# Patient Record
Sex: MALE | Race: WHITE | Employment: FULL TIME | URBAN - NONMETROPOLITAN AREA
[De-identification: names, ages, dates, MRNs, and addresses within clinical notes are randomized per-mention and may not be internally consistent; named-entity substitution may affect disease eponyms.]

---

## 2022-01-19 ENCOUNTER — OFFICE VISIT (OUTPATIENT)
Dept: PRIMARY CARE CLINIC | Age: 67
End: 2022-01-19

## 2022-01-19 VITALS
BODY MASS INDEX: 41.75 KG/M2 | SYSTOLIC BLOOD PRESSURE: 132 MMHG | HEIGHT: 73 IN | OXYGEN SATURATION: 98 % | RESPIRATION RATE: 18 BRPM | WEIGHT: 315 LBS | HEART RATE: 96 BPM | DIASTOLIC BLOOD PRESSURE: 82 MMHG

## 2022-01-19 DIAGNOSIS — L20.9 ATOPIC DERMATITIS, UNSPECIFIED TYPE: ICD-10-CM

## 2022-01-19 DIAGNOSIS — L02.91 ABSCESS: Primary | ICD-10-CM

## 2022-01-19 RX ORDER — ATENOLOL 100 MG/1
TABLET ORAL
COMMUNITY
Start: 2021-10-28

## 2022-01-19 RX ORDER — SULFAMETHOXAZOLE AND TRIMETHOPRIM 800; 160 MG/1; MG/1
1 TABLET ORAL 2 TIMES DAILY
Qty: 20 TABLET | Refills: 0 | Status: SHIPPED | OUTPATIENT
Start: 2022-01-19 | End: 2022-01-29

## 2022-01-19 RX ORDER — BETAMETHASONE DIPROPIONATE 0.05 %
OINTMENT (GRAM) TOPICAL
Qty: 30 G | Refills: 0 | Status: SHIPPED | OUTPATIENT
Start: 2022-01-19

## 2022-01-19 RX ORDER — LISINOPRIL AND HYDROCHLOROTHIAZIDE 25; 20 MG/1; MG/1
TABLET ORAL
COMMUNITY
Start: 2021-10-28

## 2022-01-19 ASSESSMENT — ENCOUNTER SYMPTOMS
VOMITING: 0
RESPIRATORY NEGATIVE: 1
COLOR CHANGE: 1

## 2022-01-19 NOTE — PROGRESS NOTES
[place]  [facility] 65 22 [address] [facility]  Dept: [phone]  Loc: [phone]    Concha Sawyer (:  1955) is a 73 y.o. male,New patient, here for evaluation of the following chief complaint(s):  Cyst (cyst on his chest that opened about 4 days ago, states it was dx in the past as an oil pocket )    Acute convenience Walk IN New pt    ASSESSMENT/PLAN:  1. Abscess  -     sulfamethoxazole-trimethoprim (BACTRIM DS) 800-160 MG per tablet; Take 1 tablet by mouth 2 times daily for 10 days, Disp-20 tablet, R-0Normal  2. Atopic dermatitis, unspecified type    Abscess:  AVS instructions provided and wound care discussed with symptoms to monitor for and requested wound check Monday as he will be leaving the area and not be coming back from [facility] until next week. He is to call for worsening symptoms    Wound was cleansed ans rinsed with chlorhexidine and sterile saline  Provided with dressing material  Covered with non adhering dressing, fluffs and paper tape after cleaning    Atopic Dermatitis:    The legs appear to have crusting scaling type lesions along the shins and are reports as itching  He has been using hydrocortisone OTC  History of dermatitis of the lower extremities  Denies DM  Advised to try some Diprolene to see if this will help the inflammation to the lower extremities     Return in about 5 days (around 2022). SUBJECTIVE/OBJECTIVE:  Judi Ngo comes in today for an acute visit for a skin issue. Draining lesion to anterior chest for 2 weeks. He reports that he has a 2 year history of nodule that a PCP saw him for and told him it was a oil gland. He reports that 2 weeks ago he squeezed the lesion it opened and drained yellow brown discharge and has been swolle and draining since that time. He is concerned for infection. Rash  This is a chronic problem.  The current episode started 1 to 4 weeks ago. The affected locations include the chest. The rash is characterized by redness, swelling and draining. Pertinent negatives include no fever or vomiting. Past treatments include nothing. Review of Systems   Constitutional: Negative for diaphoresis and fever. Respiratory: Negative. Cardiovascular: Negative. Gastrointestinal: Negative for vomiting. Skin: Positive for color change and wound. Negative for rash. Hematological: Negative for adenopathy. Physical Exam  Constitutional:       Appearance: Normal appearance. He is obese. Cardiovascular:      Pulses: Normal pulses. Pulmonary:      Effort: Pulmonary effort is normal.   Skin:     General: Skin is warm and dry. Capillary Refill: Capillary refill takes 2 to 3 seconds. Findings: Abscess, erythema, lesion and rash present. Rash is pustular. Comments: 1 inch nodular lesion to the anterior left chest wall  Positive for induration, erythema, hard nodule like presentation  The lesions is draining brown and yellow discharge    Legs:  Intense itching  erythematous scaling  crusting  scabbed rash to the shins and skin surface of BLE     Neurological:      Mental Status: He is alert and oriented to person, place, and time. Additional Information:    /82   Pulse 96   Resp 18   Ht 6' 1\" (1.854 m)   Wt (!) 331 lb (150.1 kg)   SpO2 98%   BMI 43.67 kg/m²     An electronic signature was used to authenticate this note.     --CAROLYNE Hernández - CNP

## 2022-01-19 NOTE — PATIENT INSTRUCTIONS
Patient Education        Skin Abscess: Care Instructions  Your Care Instructions     A skin abscess is a bacterial infection that forms a pocket of pus. A boil is a kind of skin abscess. The doctor may have cut an opening in the abscess so that the pus can drain out. You may have gauze in the cut so that the abscess will stay open and keep draining. You may need antibiotics. You will need to follow up with your doctor to make sure the infection has gone away. The doctor has checked you carefully, but problems can develop later. If you notice any problems or new symptoms, get medical treatment right away. Follow-up care is a key part of your treatment and safety. Be sure to make and go to all appointments, and call your doctor if you are having problems. It's also a good idea to know your test results and keep a list of the medicines you take. How can you care for yourself at home? · Apply warm and dry compresses, a heating pad set on low, or a hot water bottle 3 or 4 times a day for pain. Keep a cloth between the heat source and your skin. · If your doctor prescribed antibiotics, take them as directed. Do not stop taking them just because you feel better. You need to take the full course of antibiotics. · Take pain medicines exactly as directed. ? If the doctor gave you a prescription medicine for pain, take it as prescribed. ? If you are not taking a prescription pain medicine, ask your doctor if you can take an over-the-counter medicine. · Keep your bandage clean and dry. Change the bandage whenever it gets wet or dirty, or at least one time a day. · If the abscess was packed with gauze:  ? Keep follow-up appointments to have the gauze changed or removed. If the doctor instructed you to remove the gauze, follow the instructions you were given for how to remove it. ? After the gauze is removed, soak the area in warm water for 15 to 20 minutes 2 times a day, until the wound closes.   When should you call for help? Call your doctor now or seek immediate medical care if:    · You have signs of worsening infection, such as:  ? Increased pain, swelling, warmth, or redness. ? Red streaks leading from the infected skin. ? Pus draining from the wound. ? A fever. Watch closely for changes in your health, and be sure to contact your doctor if:    · You do not get better as expected. Where can you learn more? Go to https://[employer]ypepiceweb.[employer]. org and sign in to your [employer] account. Enter J668 in the [employer]. box to learn more about \"Skin Abscess: Care Instructions. \"     If you do not have an account, please click on the \"Sign Up Now\" link. Current as of: March 3, 2021               Content Version: 13.1  © 3485-4533 Healthwise, Incorporated. Care instructions adapted under license by [facility] ([facility]). If you have questions about a medical condition or this instruction, always ask your healthcare professional. [place] [address] any warranty or liability for your use of this information.

## 2022-01-26 ENCOUNTER — OFFICE VISIT (OUTPATIENT)
Dept: PRIMARY CARE CLINIC | Age: 67
End: 2022-01-26

## 2022-01-26 VITALS
WEIGHT: 315 LBS | BODY MASS INDEX: 41.75 KG/M2 | RESPIRATION RATE: 18 BRPM | DIASTOLIC BLOOD PRESSURE: 78 MMHG | HEIGHT: 73 IN | OXYGEN SATURATION: 98 % | HEART RATE: 90 BPM | SYSTOLIC BLOOD PRESSURE: 130 MMHG

## 2022-01-26 DIAGNOSIS — L72.0 EPIDERMOID CYST OF SKIN OF CHEST: ICD-10-CM

## 2022-01-26 DIAGNOSIS — L02.91 ABSCESS: Primary | ICD-10-CM

## 2022-01-26 RX ORDER — AMOXICILLIN AND CLAVULANATE POTASSIUM 875; 125 MG/1; MG/1
1 TABLET, FILM COATED ORAL 2 TIMES DAILY
Qty: 20 TABLET | Refills: 0 | Status: SHIPPED | OUTPATIENT
Start: 2022-01-26 | End: 2022-02-05

## 2022-01-26 ASSESSMENT — ENCOUNTER SYMPTOMS
VOMITING: 0
RESPIRATORY NEGATIVE: 1
COLOR CHANGE: 1

## 2022-01-26 NOTE — PROGRESS NOTES
[place]  [facility] [address] 22 [address] [facility]  Dept: [phone]  Loc: [phone]    Felisha Cash (:  1955) is a 73 y.o. [facility] patient, here for evaluation of the following chief complaint(s): Wound Check (here for a wound check to area on chest, states he is having improvement. )    Acute convenience Walk IN New pt    ASSESSMENT/PLAN:  1. Abscess  -     amoxicillin-clavulanate (AUGMENTIN) 875-125 MG per tablet; Take 1 tablet by mouth 2 times daily for 10 days, Disp-20 tablet, R-0Normal  -     External Referral To Dermatology  2. Epidermoid cyst of skin of chest  -     amoxicillin-clavulanate (AUGMENTIN) 875-125 MG per tablet; Take 1 tablet by mouth 2 times daily for 10 days, Disp-20 tablet, R-0Normal  -     External Referral To Dermatology    Chest Cystic Abscess:  AVS instructions provided and wound care discussed with symptoms to monitor for and requested wound check again in one week as he will be leaving to travel home to [facility] until next week. He works onsite but travels home weekly. He is to call for worsening symptoms, fever, chills or seek care    Wound was cleansed ans rinsed with chlorhexidine and sterile saline  Provided with non adhering dressing material and given supplies for wound care as the area is still draining. Induration area palpated and noted. He also has been using heat compress PRN to facilitate drainage. Will cover with additional atb coverage with Augmentin and finish the Bactrim that he was prescribed last week. Referral to Dermatologist per his request for eval and tx  as the lesion as this has been present for > 2 years and  He reports periodically becomes inflamed and infected.  His PCP has seen him for this condition in the past.       Atopic Dermatitis:    The legs appear to have crusting scaling type lesions along the shins and are reports as itching, he was prescribed diprolene and is showing improvements   Reports less itching and scabbing is resolving, skin is still dry and flaking, no signs of secondary skin infection. History of dermatitis of the lower extremities  Denies DM  Advised to continue use of the Diprolene. No follow-ups on file. SUBJECTIVE/OBJECTIVE:  Rohith Mendoza comes in today for follow up from an  an acute visit for a skin issue. He had a draining lesion to anterior chest for 2 weeks. ,   He reports that he has a 2 year history of nodule that a PCP saw him for and told him it was a oil gland. He reports that 3 weeks ago he squeezed the lesion it opened and drained yellow brown discharge and has been swollen and draining since that time. He was seen and started on Bactrim for concern for abscess and asked to follow up for wound check  He has been using chlorhexidine wash and covering daily  Reports pain less intense, draining white cloudy material.             Rash  This is a chronic problem. The current episode started 1 to 4 weeks ago. The affected locations include the chest. The rash is characterized by redness, swelling and draining. Pertinent negatives include no fever or vomiting. Past treatments include nothing. Wound Check  He was originally treated 3 to 5 days ago. Previous treatment included oral antibiotics and wound cleansing or irrigation. There has been colored discharge from the wound. The redness has improved. The swelling has improved. The pain has improved. Review of Systems   Constitutional: Negative for diaphoresis and fever. Respiratory: Negative. Cardiovascular: Negative. Gastrointestinal: Negative for vomiting. Skin: Positive for color change and wound. Negative for rash. Reports itching at indurated area of the chest lateral left sternal border. Hematological: Negative for adenopathy. Physical Exam  Constitutional:       Appearance: Normal appearance. He is obese. Cardiovascular:      Pulses: Normal pulses. Pulmonary:      Effort: Pulmonary effort is normal.   Skin:     General: Skin is warm and dry. Capillary Refill: Capillary refill takes 2 to 3 seconds. Findings: Abscess, erythema, lesion and rash present. Rash is pustular. Comments:  nodular lesion to the anterior left chest wall  Positive for induration extending approximately 2 inches oval hard indurated area. + erythema improved from prior visit, surrounding tissue of draining area has a hard nodular presentation  The lesions is draining a grayish/white discharge today  The brown and yellow discharge is resolving, no further bleeding    Legs:  Intense itching  erythematous scaling  crusting  scabbed rash to the shins and skin surface of BLE     Neurological:      Mental Status: He is alert and oriented to person, place, and time. Additional Information:    /78   Pulse 90   Resp 18   Ht 6' 1\" (1.854 m)   Wt (!) 331 lb (150.1 kg)   SpO2 98%   BMI 43.67 kg/m²     An electronic signature was used to authenticate this note.     --CAROLYNE Brooks - CNP

## 2022-01-26 NOTE — PATIENT INSTRUCTIONS
Continue to keep clean covered while draining  Started on second antibiotic as drainage still appears purulent, finish the bactrim  Continue skin care as described prior visit. I have made a dermatology referral for you. We will Call [facility] in [facility] to fax the information when they return to office.    Follow up one week for wound check

## 2023-05-15 ENCOUNTER — TELEPHONE (OUTPATIENT)
Dept: PRIMARY CARE | Facility: CLINIC | Age: 68
End: 2023-05-15
Payer: COMMERCIAL

## 2023-05-15 DIAGNOSIS — I10 HYPERTENSION, UNSPECIFIED TYPE: ICD-10-CM

## 2023-05-15 RX ORDER — LISINOPRIL AND HYDROCHLOROTHIAZIDE 20; 25 MG/1; MG/1
1 TABLET ORAL DAILY
COMMUNITY
Start: 2020-01-10 | End: 2023-05-15 | Stop reason: SDUPTHER

## 2023-05-15 RX ORDER — LISINOPRIL AND HYDROCHLOROTHIAZIDE 20; 25 MG/1; MG/1
1 TABLET ORAL DAILY
Qty: 5 TABLET | Refills: 0 | Status: SHIPPED | OUTPATIENT
Start: 2023-05-15 | End: 2023-07-28 | Stop reason: SDUPTHER

## 2023-05-15 RX ORDER — ASPIRIN 81 MG/1
81 TABLET ORAL DAILY
COMMUNITY

## 2023-05-15 RX ORDER — ATENOLOL 100 MG/1
1 TABLET ORAL DAILY
COMMUNITY
Start: 2020-01-10 | End: 2023-07-28 | Stop reason: SDUPTHER

## 2023-05-15 RX ORDER — CYCLOBENZAPRINE HCL 10 MG
10 TABLET ORAL 3 TIMES DAILY
COMMUNITY
Start: 2023-01-20

## 2023-07-19 DIAGNOSIS — E11.69 TYPE 2 DIABETES MELLITUS WITH OTHER SPECIFIED COMPLICATION, WITHOUT LONG-TERM CURRENT USE OF INSULIN (MULTI): ICD-10-CM

## 2023-07-19 DIAGNOSIS — Z12.5 SCREENING FOR PROSTATE CANCER: ICD-10-CM

## 2023-07-19 DIAGNOSIS — I10 HYPERTENSION, UNSPECIFIED TYPE: ICD-10-CM

## 2023-07-28 DIAGNOSIS — I10 HYPERTENSION, UNSPECIFIED TYPE: ICD-10-CM

## 2023-07-28 RX ORDER — LISINOPRIL AND HYDROCHLOROTHIAZIDE 20; 25 MG/1; MG/1
1 TABLET ORAL DAILY
Qty: 90 TABLET | Refills: 0 | Status: SHIPPED | OUTPATIENT
Start: 2023-07-28 | End: 2023-08-02

## 2023-07-28 RX ORDER — ATENOLOL 100 MG/1
100 TABLET ORAL DAILY
Qty: 90 TABLET | Refills: 0 | Status: SHIPPED | OUTPATIENT
Start: 2023-07-28

## 2023-07-28 NOTE — TELEPHONE ENCOUNTER
PATIENT WAS LAID OFF FROM [employer]. HE IS PLANNING TO APPLY FOR MEDICAID. WITH NO INSURANCE HE WILL NOT BE ABLE TO MAKE AN APPOINTMENT UNTIL HE HAS COVERAGE.    REQUESTED REFILLS FOR HIS BP MED. PROPOSE.

## 2023-08-10 PROBLEM — M25.519 SHOULDER PAIN: Status: ACTIVE | Noted: 2023-08-10

## 2023-08-10 PROBLEM — E66.01 OBESITY, MORBID, BMI 40.0-49.9 (MULTI): Status: ACTIVE | Noted: 2023-08-10

## 2023-08-10 PROBLEM — E11.9 DIABETES (MULTI): Status: ACTIVE | Noted: 2023-08-10

## 2023-08-10 PROBLEM — M54.2 NECK PAIN: Status: ACTIVE | Noted: 2023-08-10

## 2023-08-10 PROBLEM — J30.1 HAY FEVER: Status: ACTIVE | Noted: 2023-08-10

## 2023-08-10 PROBLEM — M75.41 IMPINGEMENT SYNDROME OF RIGHT SHOULDER: Status: ACTIVE | Noted: 2023-08-10

## 2023-08-10 PROBLEM — R19.5 POSITIVE COLORECTAL CANCER SCREENING USING COLOGUARD TEST: Status: ACTIVE | Noted: 2023-08-10

## 2023-08-10 PROBLEM — I10 HYPERTENSION, ESSENTIAL, BENIGN: Status: ACTIVE | Noted: 2023-08-10

## 2023-08-10 PROBLEM — I48.91 ATRIAL FIBRILLATION (MULTI): Status: ACTIVE | Noted: 2023-08-10

## 2023-08-10 PROBLEM — M54.16 LUMBAR RADICULOPATHY: Status: ACTIVE | Noted: 2023-08-10

## 2023-08-10 PROBLEM — M54.12 CERVICAL RADICULOPATHY: Status: ACTIVE | Noted: 2023-08-10

## 2023-08-11 ENCOUNTER — APPOINTMENT (OUTPATIENT)
Dept: PRIMARY CARE | Facility: CLINIC | Age: 68
End: 2023-08-11
Payer: COMMERCIAL

## 2023-10-02 ENCOUNTER — TELEPHONE (OUTPATIENT)
Dept: PHARMACY | Facility: HOSPITAL | Age: 68
End: 2023-10-02

## 2023-10-02 ENCOUNTER — TELEPHONE (OUTPATIENT)
Dept: CARDIOLOGY | Facility: CLINIC | Age: 68
End: 2023-10-02

## 2023-10-02 DIAGNOSIS — I48.91 ATRIAL FIBRILLATION, UNSPECIFIED TYPE (MULTI): ICD-10-CM

## 2023-10-02 NOTE — TELEPHONE ENCOUNTER
I received a Clinical Pharmacy Referral from Dr. Irwin Junior (usually follows with Dr. Hernandez) regarding Eliquis assistance. Please see information below:     Unfortunately, at this time, Deangelo Aldana is ineligible to receive financial assistance through  Patient Assistance Program because he is currently uninsured.    Patient was notified of ineligibility and given the following options: Patient states that his Medicare insurance will become active in Feb 2024. He does not have prescription insurance until then. I explained to the patient that we are not able to help him through our financial aid program until he gets prescription insurance. Options are (1) convert to warfarin or (2) ask for samples from cardio (pt is aware allocation of samples vary). Referral will be cancelled at this time. Patient will contact his cardiologist after Medicare is active (2/2024) if he needs additional assistance from pharmacy.     Referring provider will be notified of denial.     Amy Khan, PharmD

## 2023-10-04 ENCOUNTER — OFFICE VISIT (OUTPATIENT)
Dept: CARDIOLOGY | Facility: CLINIC | Age: 68
End: 2023-10-04

## 2023-10-04 VITALS
WEIGHT: 315 LBS | DIASTOLIC BLOOD PRESSURE: 72 MMHG | HEART RATE: 89 BPM | OXYGEN SATURATION: 96 % | BODY MASS INDEX: 41.75 KG/M2 | HEIGHT: 73 IN | SYSTOLIC BLOOD PRESSURE: 118 MMHG

## 2023-10-04 DIAGNOSIS — R79.89 ELEVATED SERUM CREATININE: ICD-10-CM

## 2023-10-04 DIAGNOSIS — I48.11 LONGSTANDING PERSISTENT ATRIAL FIBRILLATION (MULTI): Primary | ICD-10-CM

## 2023-10-04 PROCEDURE — 3078F DIAST BP <80 MM HG: CPT | Performed by: STUDENT IN AN ORGANIZED HEALTH CARE EDUCATION/TRAINING PROGRAM

## 2023-10-04 PROCEDURE — 99214 OFFICE O/P EST MOD 30 MIN: CPT | Performed by: STUDENT IN AN ORGANIZED HEALTH CARE EDUCATION/TRAINING PROGRAM

## 2023-10-04 PROCEDURE — 1159F MED LIST DOCD IN RCRD: CPT | Performed by: STUDENT IN AN ORGANIZED HEALTH CARE EDUCATION/TRAINING PROGRAM

## 2023-10-04 PROCEDURE — 3074F SYST BP LT 130 MM HG: CPT | Performed by: STUDENT IN AN ORGANIZED HEALTH CARE EDUCATION/TRAINING PROGRAM

## 2023-10-04 PROCEDURE — 1036F TOBACCO NON-USER: CPT | Performed by: STUDENT IN AN ORGANIZED HEALTH CARE EDUCATION/TRAINING PROGRAM

## 2023-10-04 NOTE — PROGRESS NOTES
Cardiology Subsequent Encounter Clinic Note  Name: Deangelo Aladna  MRN: [id number]  : 1955    CC: Atrial fibrillation    Active Issues:  67-year-old male with a medical history of atrial fibrillation, diabetes, hypertension, here to establish care regarding the following conditions:     Atrial fibrillation  Currently on atenolol 100 mg daily, Eliquis 5 mg twice daily     Atrial fibrillation was incidentally noted as part of her routine physical; patient currently denies any chest discomfort or shortness of breath.  Denies any orthopnea/PND/has minimal lower extremity edema.  Denies any dizziness or lightheadedness.  Echocardiogram performed 2022 shows preserved biventricular function.      Review of Systems  A complete 14 point review of systems was obtained and was negative other than what is stated in the history of present illness.    Past Medical History  No past medical history on file.    Past Surgical History  Past Surgical History:   Procedure Laterality Date    OTHER SURGICAL HISTORY  10/03/2022    Colonoscopy       Medications  Current Outpatient Medications on File Prior to Visit   Medication Sig Dispense Refill    apixaban (Eliquis) 5 mg tablet Take 1 tablet (5 mg) by mouth 2 times a day.      atenolol (Tenormin) 100 mg tablet Take 1 tablet (100 mg) by mouth once daily. 90 tablet 0    aspirin 81 mg EC tablet Take 1 tablet (81 mg) by mouth once daily.      cyclobenzaprine (Flexeril) 10 mg tablet Take 1 tablet (10 mg) by mouth 3 times a day.      lisinopriL-hydrochlorothiazide 20-25 mg tablet Take 1 tablet by mouth once daily for 5 days. 90 tablet 0     No current facility-administered medications on file prior to visit.       Allergies  No Known Allergies    Social History  Social History     Tobacco Use    Smoking status: Former     Types: Cigarettes    Smokeless tobacco: Never   Substance Use Topics    Alcohol use: Never    Drug use: Never       Family History  Family History   Problem  "Relation Name Age of Onset    Other (CVA) Mother      Hypertension Mother      Other (CVA) Father      Hypertension Father      Asthma Father      Lung cancer Sister      Diabetes type II Sister      Diabetes type II Other         Physical Examination  Vitals: /72   Pulse 89   Ht 1.854 m (6' 1\")   Wt 150 kg (330 lb)   SpO2 96%   BMI 43.54 kg/m²   General: awake, alert and oriented. No acute distress.   Skin: Skin is warm, dry and intact without rashes or lesions. Appropriate color for ethnicity. Nail beds pink with no cyanosis or clubbing  HEENT: normocephalic, atraumatic; conjunctivae are clear without exudates or hemorrhage. Sclera is non-icteric. Eyelids are normal in appearance without swelling or lesions. Hearing intact. Nares are patent bilaterally. Moist mucous membranes.   Cardiovascular: Regular. No murmurs, gallops, or rubs are auscultated. S1 and S2 are heard and are of normal intensity. No JVD, no carotid bruits  Respiratory: Thorax symmetric. CTAB, breath sounds vesicular. No crackles, wheezes or ronchi.   Gastrointestinal: soft, non-distended, BS + x 4  Genitourinary: exam deferred  Musculoskeletal: moves all extremities  Extremities: pulses palpable bilaterally; no swelling or erythema; no edema  Neurological: alert & oriented x 3; no focal deficits  Psychiatric: appropriate mood and affect       Labs/Imaging/Procedures    Lab Results   Component Value Date     08/11/2022    K 4.2 08/11/2022    CREATININE 1.40 (H) 08/11/2022    BUN 24 (H) 08/11/2022    CALCIUM 9.3 08/11/2022    LDLF 79 08/11/2022     Echo  10/22  CONCLUSIONS:   1. Left ventricular systolic function is normal with a 60% estimated ejection fraction.   2. Poorly visualized anatomical structures due to suboptimal image quality.    ELECTROCARDIOGRAM RHYTHM STRIP  Ordered by an unspecified provider.    Impression    67-year-old male with a medical history of atrial fibrillation, diabetes, hypertension, here to establish " care regarding the following conditions:     Atrial fibrillation  Currently on atenolol 100 mg daily, Eliquis 5 mg twice daily  -Ostensibly, appears to be paroxysmal/nonvalvular. Continue anticoagulation given high [id number] score     Plan  -Patient does not have insurance; plans to apply for patient assistance to be able to get Eliquis.  Rate control seems to be an appropriate strategy at this point since he is minimally symptomatic  -We will get repeat labs given minimally elevated creatinine on previous ones.  We will also get a CBC  -RTC 1 year    Michael Hernandez MD  Advanced Heart Failure/Transplant Cardiology  Cardio-Oncology  [facility]

## 2023-10-30 NOTE — TELEPHONE ENCOUNTER
Pt called office on 10/27, took last pill of Eliquis on hand that morning. Pt just got approved through [employer] to receive medication now through patient assistance. Expected to receive first supply on today 11/23. [facility] [address] [place] notified for 3 day supply to supplement.

## 2023-11-01 DIAGNOSIS — I48.91 ATRIAL FIBRILLATION, UNSPECIFIED TYPE (MULTI): ICD-10-CM

## 2024-07-29 ENCOUNTER — LAB (OUTPATIENT)
Dept: LAB | Facility: LAB | Age: 69
End: 2024-07-29
Payer: COMMERCIAL

## 2024-07-29 ENCOUNTER — APPOINTMENT (OUTPATIENT)
Dept: PRIMARY CARE | Facility: CLINIC | Age: 69
End: 2024-07-29
Payer: COMMERCIAL

## 2024-07-29 VITALS
SYSTOLIC BLOOD PRESSURE: 110 MMHG | HEIGHT: 73 IN | DIASTOLIC BLOOD PRESSURE: 70 MMHG | OXYGEN SATURATION: 96 % | WEIGHT: 315 LBS | HEART RATE: 92 BPM | BODY MASS INDEX: 41.75 KG/M2

## 2024-07-29 DIAGNOSIS — I48.91 ATRIAL FIBRILLATION, UNSPECIFIED TYPE (MULTI): ICD-10-CM

## 2024-07-29 DIAGNOSIS — I10 HYPERTENSION, ESSENTIAL, BENIGN: ICD-10-CM

## 2024-07-29 DIAGNOSIS — I10 HYPERTENSION, UNSPECIFIED TYPE: ICD-10-CM

## 2024-07-29 DIAGNOSIS — Z00.00 ROUTINE GENERAL MEDICAL EXAMINATION AT HEALTH CARE FACILITY: Primary | ICD-10-CM

## 2024-07-29 DIAGNOSIS — E11.9 TYPE 2 DIABETES MELLITUS WITHOUT COMPLICATION, WITHOUT LONG-TERM CURRENT USE OF INSULIN (MULTI): ICD-10-CM

## 2024-07-29 DIAGNOSIS — Z12.5 SCREENING FOR PROSTATE CANCER: ICD-10-CM

## 2024-07-29 DIAGNOSIS — E66.01 OBESITY, MORBID, BMI 40.0-49.9 (MULTI): ICD-10-CM

## 2024-07-29 PROBLEM — R19.5 POSITIVE COLORECTAL CANCER SCREENING USING COLOGUARD TEST: Status: RESOLVED | Noted: 2023-08-10 | Resolved: 2024-07-29

## 2024-07-29 LAB
ALBUMIN SERPL BCP-MCNC: 4.2 G/DL (ref 3.4–5)
ALP SERPL-CCNC: 65 U/L (ref 33–136)
ALT SERPL W P-5'-P-CCNC: 15 U/L (ref 10–52)
ANION GAP SERPL CALC-SCNC: 12 MMOL/L (ref 10–20)
AST SERPL W P-5'-P-CCNC: 16 U/L (ref 9–39)
BASOPHILS # BLD AUTO: 0.04 X10*3/UL (ref 0–0.1)
BASOPHILS NFR BLD AUTO: 0.5 %
BILIRUB SERPL-MCNC: 0.7 MG/DL (ref 0–1.2)
BUN SERPL-MCNC: 24 MG/DL (ref 6–23)
CALCIUM SERPL-MCNC: 9.7 MG/DL (ref 8.6–10.3)
CHLORIDE SERPL-SCNC: 102 MMOL/L (ref 98–107)
CHOLEST SERPL-MCNC: 163 MG/DL (ref 0–199)
CHOLESTEROL/HDL RATIO: 4.8
CO2 SERPL-SCNC: 30 MMOL/L (ref 21–32)
CREAT SERPL-MCNC: 1.66 MG/DL (ref 0.5–1.3)
EGFRCR SERPLBLD CKD-EPI 2021: 45 ML/MIN/1.73M*2
EOSINOPHIL # BLD AUTO: 0.21 X10*3/UL (ref 0–0.7)
EOSINOPHIL NFR BLD AUTO: 2.4 %
ERYTHROCYTE [DISTWIDTH] IN BLOOD BY AUTOMATED COUNT: 12.6 % (ref 11.5–14.5)
GLUCOSE SERPL-MCNC: 133 MG/DL (ref 74–99)
HCT VFR BLD AUTO: 47.3 % (ref 41–52)
HDLC SERPL-MCNC: 34 MG/DL
HGB BLD-MCNC: 15 G/DL (ref 13.5–17.5)
IMM GRANULOCYTES # BLD AUTO: 0.03 X10*3/UL (ref 0–0.7)
IMM GRANULOCYTES NFR BLD AUTO: 0.3 % (ref 0–0.9)
LDLC SERPL CALC-MCNC: 82 MG/DL
LYMPHOCYTES # BLD AUTO: 1.97 X10*3/UL (ref 1.2–4.8)
LYMPHOCYTES NFR BLD AUTO: 22.5 %
MCH RBC QN AUTO: 27.7 PG (ref 26–34)
MCHC RBC AUTO-ENTMCNC: 31.7 G/DL (ref 32–36)
MCV RBC AUTO: 87 FL (ref 80–100)
MONOCYTES # BLD AUTO: 0.77 X10*3/UL (ref 0.1–1)
MONOCYTES NFR BLD AUTO: 8.8 %
NEUTROPHILS # BLD AUTO: 5.74 X10*3/UL (ref 1.2–7.7)
NEUTROPHILS NFR BLD AUTO: 65.5 %
NON HDL CHOLESTEROL: 129 MG/DL (ref 0–149)
NRBC BLD-RTO: 0 /100 WBCS (ref 0–0)
PLATELET # BLD AUTO: 240 X10*3/UL (ref 150–450)
POTASSIUM SERPL-SCNC: 3.9 MMOL/L (ref 3.5–5.3)
PROT SERPL-MCNC: 6.9 G/DL (ref 6.4–8.2)
PSA SERPL-MCNC: 0.53 NG/ML
RBC # BLD AUTO: 5.42 X10*6/UL (ref 4.5–5.9)
SODIUM SERPL-SCNC: 140 MMOL/L (ref 136–145)
TRIGL SERPL-MCNC: 236 MG/DL (ref 0–149)
TSH SERPL-ACNC: 2.49 MIU/L (ref 0.44–3.98)
VLDL: 47 MG/DL (ref 0–40)
WBC # BLD AUTO: 8.8 X10*3/UL (ref 4.4–11.3)

## 2024-07-29 PROCEDURE — 1124F ACP DISCUSS-NO DSCNMKR DOCD: CPT | Performed by: FAMILY MEDICINE

## 2024-07-29 PROCEDURE — 1160F RVW MEDS BY RX/DR IN RCRD: CPT | Performed by: FAMILY MEDICINE

## 2024-07-29 PROCEDURE — 83036 HEMOGLOBIN GLYCOSYLATED A1C: CPT

## 2024-07-29 PROCEDURE — 80061 LIPID PANEL: CPT

## 2024-07-29 PROCEDURE — 1159F MED LIST DOCD IN RCRD: CPT | Performed by: FAMILY MEDICINE

## 2024-07-29 PROCEDURE — G0103 PSA SCREENING: HCPCS

## 2024-07-29 PROCEDURE — 3074F SYST BP LT 130 MM HG: CPT | Performed by: FAMILY MEDICINE

## 2024-07-29 PROCEDURE — 36415 COLL VENOUS BLD VENIPUNCTURE: CPT

## 2024-07-29 PROCEDURE — G0439 PPPS, SUBSEQ VISIT: HCPCS | Performed by: FAMILY MEDICINE

## 2024-07-29 PROCEDURE — 99213 OFFICE O/P EST LOW 20 MIN: CPT | Performed by: FAMILY MEDICINE

## 2024-07-29 PROCEDURE — 3078F DIAST BP <80 MM HG: CPT | Performed by: FAMILY MEDICINE

## 2024-07-29 PROCEDURE — 1036F TOBACCO NON-USER: CPT | Performed by: FAMILY MEDICINE

## 2024-07-29 PROCEDURE — 3008F BODY MASS INDEX DOCD: CPT | Performed by: FAMILY MEDICINE

## 2024-07-29 PROCEDURE — 80053 COMPREHEN METABOLIC PANEL: CPT

## 2024-07-29 PROCEDURE — 1170F FXNL STATUS ASSESSED: CPT | Performed by: FAMILY MEDICINE

## 2024-07-29 PROCEDURE — 84443 ASSAY THYROID STIM HORMONE: CPT

## 2024-07-29 PROCEDURE — 85025 COMPLETE CBC W/AUTO DIFF WBC: CPT

## 2024-07-29 RX ORDER — LISINOPRIL AND HYDROCHLOROTHIAZIDE 20; 25 MG/1; MG/1
1 TABLET ORAL DAILY
Qty: 90 TABLET | Refills: 3 | Status: SHIPPED | OUTPATIENT
Start: 2024-07-29 | End: 2025-07-29

## 2024-07-29 RX ORDER — ATENOLOL 100 MG/1
100 TABLET ORAL DAILY
Qty: 90 TABLET | Refills: 3 | Status: SHIPPED | OUTPATIENT
Start: 2024-07-29 | End: 2025-07-29

## 2024-07-29 ASSESSMENT — ENCOUNTER SYMPTOMS
NAUSEA: 0
ADENOPATHY: 0
TROUBLE SWALLOWING: 0
DEPRESSION: 0
NUMBNESS: 0
HEADACHES: 0
APPETITE CHANGE: 0
PALPITATIONS: 0
VOMITING: 0
COUGH: 0
ARTHRALGIAS: 0
LOSS OF SENSATION IN FEET: 0
SHORTNESS OF BREATH: 0
LIGHT-HEADEDNESS: 0
ABDOMINAL PAIN: 0
FATIGUE: 0
OCCASIONAL FEELINGS OF UNSTEADINESS: 0
DIZZINESS: 0
ACTIVITY CHANGE: 0
UNEXPECTED WEIGHT CHANGE: 0
NECK PAIN: 1
DIFFICULTY URINATING: 0
WHEEZING: 0
ABDOMINAL DISTENTION: 0
NECK STIFFNESS: 1
CONSTIPATION: 0
DIARRHEA: 0
RHINORRHEA: 0
WEAKNESS: 0

## 2024-07-29 ASSESSMENT — ACTIVITIES OF DAILY LIVING (ADL)
DOING_HOUSEWORK: INDEPENDENT
TAKING_MEDICATION: INDEPENDENT
GROCERY_SHOPPING: INDEPENDENT
BATHING: INDEPENDENT
MANAGING_FINANCES: INDEPENDENT
DRESSING: INDEPENDENT

## 2024-07-29 ASSESSMENT — PATIENT HEALTH QUESTIONNAIRE - PHQ9
2. FEELING DOWN, DEPRESSED OR HOPELESS: NOT AT ALL
1. LITTLE INTEREST OR PLEASURE IN DOING THINGS: NOT AT ALL
SUM OF ALL RESPONSES TO PHQ9 QUESTIONS 1 AND 2: 0

## 2024-07-29 NOTE — PROGRESS NOTES
"Subjective   Reason for Visit: Deangelo Aldana is an 68 y.o. male here for a Medicare Wellness visit.     Past Medical, Surgical, and Family History reviewed and updated in chart.    Reviewed all medications by prescribing practitioner or clinical pharmacist (such as prescriptions, OTCs, herbal therapies and supplements) and documented in the medical record.    HPI  No headache, chest pain, shortness of breath, dizziness, lightheadedness, or edema  Taking and tolerating Eliquis, seen Cardiology last fall, no palpitations  No low blood sugars since last OV, seen opthalmology in the past year, and no numbness or tingling in feet, skin normal.  Retired and on Medicare now  Occ GERD, no dysphagia  Neck pain at times, had MRI done and did PT, does HEP, no weakness, no leg weakness or falls  Defers pneumonia vaccine  \  Patient Care Team:  Navdeep Bear MD as PCP - General (Family Medicine)  Navdeep Bear MD as PCP - [facility] [employer] Advantage PCP     Review of Systems   Constitutional:  Negative for activity change, appetite change, fatigue and unexpected weight change.   HENT:  Negative for ear pain, nosebleeds, rhinorrhea, sneezing and trouble swallowing.    Respiratory:  Negative for cough, shortness of breath and wheezing.    Cardiovascular:  Negative for chest pain, palpitations and leg swelling.   Gastrointestinal:  Negative for abdominal distention, abdominal pain, constipation, diarrhea, nausea and vomiting.   Genitourinary:  Negative for difficulty urinating.   Musculoskeletal:  Positive for neck pain and neck stiffness. Negative for arthralgias.   Skin:  Negative for rash.   Neurological:  Negative for dizziness, weakness, light-headedness, numbness and headaches.   Hematological:  Negative for adenopathy.   Psychiatric/Behavioral:  Negative for behavioral problems.    All other systems reviewed and are negative.      Objective   Vitals:  /70   Pulse 92   Ht 1.854 m (6' 1\")   Wt " 147 kg (323 lb 9.6 oz)   SpO2 96%   BMI 42.69 kg/m²       Physical Exam  Vitals and nursing note reviewed.   Constitutional:       General: He is not in acute distress.     Appearance: Normal appearance. He is not toxic-appearing.   HENT:      Head: Normocephalic and atraumatic.      Right Ear: Tympanic membrane, ear canal and external ear normal.      Left Ear: Tympanic membrane, ear canal and external ear normal.      Nose: Nose normal.      Mouth/Throat:      Mouth: Mucous membranes are moist.      Pharynx: Oropharynx is clear.   Eyes:      Extraocular Movements: Extraocular movements intact.      Conjunctiva/sclera: Conjunctivae normal.      Pupils: Pupils are equal, round, and reactive to light.   Cardiovascular:      Rate and Rhythm: Normal rate and regular rhythm.      Pulses: Normal pulses.      Heart sounds: Normal heart sounds.   Pulmonary:      Effort: Pulmonary effort is normal.      Breath sounds: Normal breath sounds.   Abdominal:      General: Abdomen is flat. Bowel sounds are normal.      Palpations: Abdomen is soft.   Musculoskeletal:      Cervical back: Normal range of motion and neck supple.   Skin:     General: Skin is warm and dry.      Capillary Refill: Capillary refill takes less than 2 seconds.   Neurological:      General: No focal deficit present.      Mental Status: He is alert and oriented to person, place, and time. Mental status is at baseline.   Psychiatric:         Mood and Affect: Mood normal.         Behavior: Behavior normal.         Assessment/Plan   Problem List Items Addressed This Visit             ICD-10-CM    Atrial fibrillation (Multi) I48.91     Not aware of palpitations, tolerating apixaban, check labs.         Relevant Medications    atenolol (Tenormin) 100 mg tablet    Other Relevant Orders    Follow Up In Primary Care - Established    CBC and Auto Differential    Comprehensive Metabolic Panel    Diabetes (Multi) E11.9     Check laboratory testing, encouraged about  diet and exercise.         Relevant Orders    Follow Up In Primary Care - Established    Comprehensive Metabolic Panel    Hemoglobin A1C    TSH with reflex to Free T4 if abnormal    Lipid Panel    Hypertension, essential, benign I10     Blood pressure under good control, check electrolyte and renal functions.         Relevant Orders    Follow Up In Primary Care - Established    Comprehensive Metabolic Panel    Obesity, morbid, BMI 40.0-49.9 (Multi) E66.01     Encouraged about diet and exercise.         Relevant Orders    Follow Up In Primary Care - Established     Other Visit Diagnoses         Codes    Routine general medical examination at health care facility    -  Primary Z00.00    Relevant Orders    Follow Up In Primary Care - Established    Hypertension, unspecified type     I10    Relevant Medications    atenolol (Tenormin) 100 mg tablet    lisinopriL-hydrochlorothiazide 20-25 mg tablet    Other Relevant Orders    Follow Up In Primary Care - Established    Comprehensive Metabolic Panel    Screening for prostate cancer     Z12.5    Relevant Orders    Follow Up In Primary Care - Established    Prostate Specific Antigen, Screen

## 2024-07-30 LAB
EST. AVERAGE GLUCOSE BLD GHB EST-MCNC: 126 MG/DL
HBA1C MFR BLD: 6 %

## 2024-09-25 DIAGNOSIS — I48.91 ATRIAL FIBRILLATION, UNSPECIFIED TYPE (MULTI): ICD-10-CM

## 2024-10-03 ENCOUNTER — APPOINTMENT (OUTPATIENT)
Dept: CARDIOLOGY | Facility: CLINIC | Age: 69
End: 2024-10-03
Payer: COMMERCIAL

## 2024-10-03 VITALS
HEIGHT: 73 IN | HEART RATE: 79 BPM | BODY MASS INDEX: 41.75 KG/M2 | WEIGHT: 315 LBS | OXYGEN SATURATION: 96 % | DIASTOLIC BLOOD PRESSURE: 72 MMHG | SYSTOLIC BLOOD PRESSURE: 120 MMHG

## 2024-10-03 DIAGNOSIS — I48.91 ATRIAL FIBRILLATION, UNSPECIFIED TYPE (MULTI): ICD-10-CM

## 2024-10-03 PROCEDURE — 99213 OFFICE O/P EST LOW 20 MIN: CPT | Performed by: STUDENT IN AN ORGANIZED HEALTH CARE EDUCATION/TRAINING PROGRAM

## 2024-10-03 PROCEDURE — 3048F LDL-C <100 MG/DL: CPT | Performed by: STUDENT IN AN ORGANIZED HEALTH CARE EDUCATION/TRAINING PROGRAM

## 2024-10-03 PROCEDURE — 93000 ELECTROCARDIOGRAM COMPLETE: CPT | Performed by: STUDENT IN AN ORGANIZED HEALTH CARE EDUCATION/TRAINING PROGRAM

## 2024-10-03 PROCEDURE — 1159F MED LIST DOCD IN RCRD: CPT | Performed by: STUDENT IN AN ORGANIZED HEALTH CARE EDUCATION/TRAINING PROGRAM

## 2024-10-03 PROCEDURE — 3074F SYST BP LT 130 MM HG: CPT | Performed by: STUDENT IN AN ORGANIZED HEALTH CARE EDUCATION/TRAINING PROGRAM

## 2024-10-03 PROCEDURE — 3044F HG A1C LEVEL LT 7.0%: CPT | Performed by: STUDENT IN AN ORGANIZED HEALTH CARE EDUCATION/TRAINING PROGRAM

## 2024-10-03 PROCEDURE — 3008F BODY MASS INDEX DOCD: CPT | Performed by: STUDENT IN AN ORGANIZED HEALTH CARE EDUCATION/TRAINING PROGRAM

## 2024-10-03 PROCEDURE — 1036F TOBACCO NON-USER: CPT | Performed by: STUDENT IN AN ORGANIZED HEALTH CARE EDUCATION/TRAINING PROGRAM

## 2024-10-03 PROCEDURE — 3078F DIAST BP <80 MM HG: CPT | Performed by: STUDENT IN AN ORGANIZED HEALTH CARE EDUCATION/TRAINING PROGRAM

## 2024-10-03 RX ORDER — BISMUTH SUBSALICYLATE 262 MG
1 TABLET,CHEWABLE ORAL DAILY
COMMUNITY

## 2024-10-03 NOTE — PROGRESS NOTES
Cardiology Subsequent Encounter Clinic Note  Name: Deangelo Aldana  MRN: [id number]  : 1955    CC: Atrial fibrillation    Active Issues:  68-year-old male with a medical history of atrial fibrillation, diabetes, hypertension, here to establish care regarding the following conditions:     Atrial fibrillation  Currently on atenolol 100 mg daily, Eliquis 5 mg twice daily     Atrial fibrillation was incidentally noted as part of her routine physical; patient currently denies any chest discomfort or shortness of breath.  Denies any orthopnea/PND/has minimal lower extremity edema.  Denies any dizziness or lightheadedness.  Echocardiogram performed 2022 shows preserved biventricular function.    Review of Systems  A complete 14 point review of systems was obtained and was negative other than what is stated in the history of present illness.    Past Medical History  No past medical history on file.    Past Surgical History  Past Surgical History:   Procedure Laterality Date    OTHER SURGICAL HISTORY  10/03/2022    Colonoscopy       Medications  Current Outpatient Medications on File Prior to Visit   Medication Sig Dispense Refill    apixaban (Eliquis) 5 mg tablet Take 1 tablet (5 mg) by mouth 2 times a day. 180 tablet 3    atenolol (Tenormin) 100 mg tablet Take 1 tablet (100 mg) by mouth once daily. 90 tablet 3    COQ10, UBIQUINOL, ORAL Take 1 tablet by mouth early in the morning..      KRILL OIL ORAL Take 1 tablet by mouth once daily.      lisinopriL-hydrochlorothiazide 20-25 mg tablet Take 1 tablet by mouth once daily. 90 tablet 3    multivitamin tablet Take 1 tablet by mouth once daily.      aspirin 81 mg EC tablet Take 1 tablet (81 mg) by mouth once daily.       No current facility-administered medications on file prior to visit.       Allergies  No Known Allergies    Social History  Social History     Tobacco Use    Smoking status: Former     Types: Cigarettes     Passive exposure: Past    Smokeless  "tobacco: Never   Vaping Use    Vaping status: Never Used   Substance Use Topics    Alcohol use: Never    Drug use: Never       Family History  Family History   Problem Relation Name Age of Onset    Other (CVA) Mother      Hypertension Mother      Other (CVA) Father      Hypertension Father      Asthma Father      Lung cancer Sister      Diabetes type II Sister      Diabetes type II Other         Physical Examination  Vitals: /72   Pulse 79   Ht 1.854 m (6' 1\")   Wt 144 kg (318 lb)   SpO2 96%   BMI 41.96 kg/m²   General: awake, alert and oriented. No acute distress.   Skin: Skin is warm, dry and intact without rashes or lesions. Appropriate color for ethnicity. Nail beds pink with no cyanosis or clubbing  HEENT: normocephalic, atraumatic; conjunctivae are clear without exudates or hemorrhage. Sclera is non-icteric. Eyelids are normal in appearance without swelling or lesions. Hearing intact. Nares are patent bilaterally. Moist mucous membranes.   Cardiovascular: Regular. No murmurs, gallops, or rubs are auscultated. S1 and S2 are heard and are of normal intensity. No JVD, no carotid bruits  Respiratory: Thorax symmetric. CTAB, breath sounds vesicular. No crackles, wheezes or ronchi.   Gastrointestinal: soft, non-distended, BS + x 4  Genitourinary: exam deferred  Musculoskeletal: moves all extremities  Extremities: pulses palpable bilaterally; no swelling or erythema; no edema  Neurological: alert & oriented x 3; no focal deficits  Psychiatric: appropriate mood and affect       Labs/Imaging/Procedures    Lab Results   Component Value Date    HGB 15.0 07/29/2024     07/29/2024    WBC 8.8 07/29/2024     07/29/2024    K 3.9 07/29/2024    CREATININE 1.66 (H) 07/29/2024    CREATININE 1.40 (H) 08/11/2022    BUN 24 (H) 07/29/2024    CALCIUM 9.7 07/29/2024    LDLF 79 08/11/2022     Echo  10/22  CONCLUSIONS:   1. Left ventricular systolic function is normal with a 60% estimated ejection fraction.   2. " Poorly visualized anatomical structures due to suboptimal image quality.    ELECTROCARDIOGRAM RHYTHM STRIP  Ordered by an unspecified provider.    Impression    68-year-old male with a medical history of atrial fibrillation, diabetes, hypertension, here to establish care regarding the following conditions:     Atrial fibrillation  Currently on atenolol 100 mg daily, Eliquis 5 mg twice daily  -Ostensibly, appears to be paroxysmal/nonvalvular. Continue anticoagulation given high [id number] score     Plan  -RTC 1 year    Michael Hernandez MD  Advanced Heart Failure/Transplant Cardiology  Cardio-Oncology  [facility] [place]

## 2025-07-28 ENCOUNTER — APPOINTMENT (OUTPATIENT)
Age: 70
End: 2025-07-28
Payer: COMMERCIAL

## 2025-07-28 VITALS
DIASTOLIC BLOOD PRESSURE: 60 MMHG | OXYGEN SATURATION: 98 % | HEIGHT: 73 IN | BODY MASS INDEX: 41.35 KG/M2 | WEIGHT: 312 LBS | SYSTOLIC BLOOD PRESSURE: 102 MMHG | HEART RATE: 97 BPM

## 2025-07-28 DIAGNOSIS — E11.9 TYPE 2 DIABETES MELLITUS WITHOUT COMPLICATION, WITHOUT LONG-TERM CURRENT USE OF INSULIN: ICD-10-CM

## 2025-07-28 DIAGNOSIS — I10 HYPERTENSION, UNSPECIFIED TYPE: ICD-10-CM

## 2025-07-28 DIAGNOSIS — Z00.00 ROUTINE GENERAL MEDICAL EXAMINATION AT HEALTH CARE FACILITY: Primary | ICD-10-CM

## 2025-07-28 DIAGNOSIS — I10 HYPERTENSION, ESSENTIAL, BENIGN: ICD-10-CM

## 2025-07-28 DIAGNOSIS — E66.01 OBESITY, MORBID, BMI 40.0-49.9 (MULTI): ICD-10-CM

## 2025-07-28 DIAGNOSIS — N18.31 CHRONIC KIDNEY DISEASE, STAGE 3A (MULTI): ICD-10-CM

## 2025-07-28 DIAGNOSIS — I48.91 ATRIAL FIBRILLATION, UNSPECIFIED TYPE (MULTI): ICD-10-CM

## 2025-07-28 PROCEDURE — 99213 OFFICE O/P EST LOW 20 MIN: CPT | Performed by: FAMILY MEDICINE

## 2025-07-28 PROCEDURE — 3008F BODY MASS INDEX DOCD: CPT | Performed by: FAMILY MEDICINE

## 2025-07-28 PROCEDURE — 3078F DIAST BP <80 MM HG: CPT | Performed by: FAMILY MEDICINE

## 2025-07-28 PROCEDURE — G0439 PPPS, SUBSEQ VISIT: HCPCS | Performed by: FAMILY MEDICINE

## 2025-07-28 PROCEDURE — 3074F SYST BP LT 130 MM HG: CPT | Performed by: FAMILY MEDICINE

## 2025-07-28 PROCEDURE — 1170F FXNL STATUS ASSESSED: CPT | Performed by: FAMILY MEDICINE

## 2025-07-28 PROCEDURE — 1159F MED LIST DOCD IN RCRD: CPT | Performed by: FAMILY MEDICINE

## 2025-07-28 PROCEDURE — 1124F ACP DISCUSS-NO DSCNMKR DOCD: CPT | Performed by: FAMILY MEDICINE

## 2025-07-28 PROCEDURE — 1160F RVW MEDS BY RX/DR IN RCRD: CPT | Performed by: FAMILY MEDICINE

## 2025-07-28 RX ORDER — ATENOLOL 100 MG/1
100 TABLET ORAL DAILY
Qty: 90 TABLET | Refills: 3 | Status: SHIPPED | OUTPATIENT
Start: 2025-07-28 | End: 2026-07-28

## 2025-07-28 RX ORDER — LISINOPRIL AND HYDROCHLOROTHIAZIDE 20; 25 MG/1; MG/1
1 TABLET ORAL DAILY
Qty: 90 TABLET | Refills: 3 | Status: SHIPPED | OUTPATIENT
Start: 2025-07-28 | End: 2026-07-28

## 2025-07-28 ASSESSMENT — ENCOUNTER SYMPTOMS
SHORTNESS OF BREATH: 0
VOMITING: 0
NAUSEA: 0
DEPRESSION: 0
COUGH: 0
DIARRHEA: 0
DIZZINESS: 0
ABDOMINAL PAIN: 0
TROUBLE SWALLOWING: 0
LIGHT-HEADEDNESS: 0
UNEXPECTED WEIGHT CHANGE: 0
RHINORRHEA: 0
HEADACHES: 0
CONSTIPATION: 0
WHEEZING: 0
ABDOMINAL DISTENTION: 0
DIFFICULTY URINATING: 0
APPETITE CHANGE: 0
ACTIVITY CHANGE: 0
LOSS OF SENSATION IN FEET: 0
FATIGUE: 0
ADENOPATHY: 0
NUMBNESS: 0
OCCASIONAL FEELINGS OF UNSTEADINESS: 0
PALPITATIONS: 0
ARTHRALGIAS: 0

## 2025-07-28 ASSESSMENT — ACTIVITIES OF DAILY LIVING (ADL)
TAKING_MEDICATION: INDEPENDENT
DOING_HOUSEWORK: INDEPENDENT
GROCERY_SHOPPING: INDEPENDENT
BATHING: INDEPENDENT
DRESSING: INDEPENDENT
MANAGING_FINANCES: INDEPENDENT

## 2025-07-28 NOTE — ASSESSMENT & PLAN NOTE
On chronic apixaban, tolerating atenolol, not aware of palpitations, seen cardiology in the past year  Orders:    Follow Up In Primary Care - Established    CBC and Auto Differential; Future    Comprehensive Metabolic Panel; Future

## 2025-07-28 NOTE — ASSESSMENT & PLAN NOTE
Encouraged to continue with diet and exercise  Orders:    Follow Up In Primary Care - Established

## 2025-07-28 NOTE — ASSESSMENT & PLAN NOTE
Check A1c testing, advised about diet and exercise, his last almost 20 pounds in the past couple years.  Orders:    Follow Up In Primary Care - Established    Comprehensive Metabolic Panel; Future    TSH with reflex to Free T4 if abnormal; Future    Albumin-Creatinine Ratio, Urine Random; Future    Lipid Panel; Future    Hemoglobin A1C; Future

## 2025-07-28 NOTE — PROGRESS NOTES
"Subjective   Reason for Visit: Deangelo Aldana is an 69 y.o. male here for a Medicare Wellness visit.     Past Medical, Surgical, and Family History reviewed and updated in chart.    Reviewed all medications by prescribing practitioner or clinical pharmacist (such as prescriptions, OTCs, herbal therapies and supplements) and documented in the medical record.    HPI  No headache, chest pain, shortness of breath, dizziness, lightheadedness, or edema (some better)  Last seen cardiology in October, taking and tolerating Eliquis  Colonoscopy in 2022 was normal  Pain in shoulders and knees, no limits,uses APAP as needed  No palpitations  No low blood sugars since last OV, seen opthalmology in the past year, and no numbness or tingling in feet, skin normal.  No bowel issues, no urine issues      Patient Care Team:  Navdeep Bear MD as PCP - General (Family Medicine)     Review of Systems   Constitutional:  Negative for activity change, appetite change, fatigue and unexpected weight change.   HENT:  Negative for ear pain, nosebleeds, rhinorrhea, sneezing and trouble swallowing.    Respiratory:  Negative for cough, shortness of breath and wheezing.    Cardiovascular:  Negative for chest pain, palpitations and leg swelling.   Gastrointestinal:  Negative for abdominal distention, abdominal pain, constipation, diarrhea, nausea and vomiting.   Genitourinary:  Negative for difficulty urinating.   Musculoskeletal:  Negative for arthralgias.   Skin:  Negative for rash.   Neurological:  Negative for dizziness, light-headedness, numbness and headaches.   Hematological:  Negative for adenopathy.   Psychiatric/Behavioral:  Negative for behavioral problems.    All other systems reviewed and are negative.      Objective   Vitals:  /60   Pulse 97   Ht 1.854 m (6' 1\")   Wt 142 kg (312 lb)   SpO2 98%   BMI 41.16 kg/m²       Physical Exam  Vitals and nursing note reviewed.   Constitutional:       Appearance: Normal " appearance.   HENT:      Head: Normocephalic and atraumatic.      Right Ear: Tympanic membrane, ear canal and external ear normal.      Left Ear: Tympanic membrane, ear canal and external ear normal.      Nose: Nose normal.      Mouth/Throat:      Mouth: Mucous membranes are moist.      Pharynx: Oropharynx is clear.     Cardiovascular:      Rate and Rhythm: Normal rate and regular rhythm.      Pulses: Normal pulses.      Heart sounds: Normal heart sounds.   Pulmonary:      Effort: Pulmonary effort is normal.      Breath sounds: Normal breath sounds.     Musculoskeletal:      Cervical back: Normal range of motion and neck supple.     Skin:     General: Skin is warm and dry.      Capillary Refill: Capillary refill takes less than 2 seconds.     Neurological:      Mental Status: He is alert.     Psychiatric:         Mood and Affect: Mood normal.         Behavior: Behavior normal.         Assessment & Plan  Hypertension, unspecified type    Orders:    Follow Up In Primary Care - Established    atenolol (Tenormin) 100 mg tablet; Take 1 tablet (100 mg) by mouth once daily.    lisinopriL-hydrochlorothiazide 20-25 mg tablet; Take 1 tablet by mouth once daily.    Comprehensive Metabolic Panel; Future    Albumin-Creatinine Ratio, Urine Random; Future    Routine general medical examination at health care facility    Orders:    Follow Up In Primary Care - Established    1 Year Follow Up In Primary Care - Wellness Exam; Future    Atrial fibrillation, unspecified type (Multi)  On chronic apixaban, tolerating atenolol, not aware of palpitations, seen cardiology in the past year  Orders:    Follow Up In Primary Care - Established    CBC and Auto Differential; Future    Comprehensive Metabolic Panel; Future    Hypertension, essential, benign  Blood pressures under good control renal and electrolytes are normal, no change  Orders:    Follow Up In Primary Care - Established    Comprehensive Metabolic Panel; Future     Albumin-Creatinine Ratio, Urine Random; Future    Type 2 diabetes mellitus without complication, without long-term current use of insulin  Check A1c testing, advised about diet and exercise, his last almost 20 pounds in the past couple years.  Orders:    Follow Up In Primary Care - Established    Comprehensive Metabolic Panel; Future    TSH with reflex to Free T4 if abnormal; Future    Albumin-Creatinine Ratio, Urine Random; Future    Lipid Panel; Future    Hemoglobin A1C; Future    Obesity, morbid, BMI 40.0-49.9 (Multi)  Encouraged to continue with diet and exercise  Orders:    Follow Up In Primary Care - Established    Chronic kidney disease, stage 3a (Multi)  Check laboratory testing today along with microalbumin  Orders:    CBC and Auto Differential; Future    Comprehensive Metabolic Panel; Future    Albumin-Creatinine Ratio, Urine Random; Future

## 2025-07-28 NOTE — ASSESSMENT & PLAN NOTE
Blood pressures under good control renal and electrolytes are normal, no change  Orders:    Follow Up In Primary Care - Established    Comprehensive Metabolic Panel; Future    Albumin-Creatinine Ratio, Urine Random; Future

## 2025-07-28 NOTE — ASSESSMENT & PLAN NOTE
Check laboratory testing today along with microalbumin  Orders:    CBC and Auto Differential; Future    Comprehensive Metabolic Panel; Future    Albumin-Creatinine Ratio, Urine Random; Future

## 2025-07-29 LAB
ALBUMIN SERPL-MCNC: 4.2 G/DL (ref 3.6–5.1)
ALBUMIN/CREAT UR: 20 MG/G CREAT
ALP SERPL-CCNC: 73 U/L (ref 35–144)
ALT SERPL-CCNC: 20 U/L (ref 9–46)
ANION GAP SERPL CALCULATED.4IONS-SCNC: 11 MMOL/L (CALC) (ref 7–17)
AST SERPL-CCNC: 20 U/L (ref 10–35)
BASOPHILS # BLD AUTO: 54 CELLS/UL (ref 0–200)
BASOPHILS NFR BLD AUTO: 0.6 %
BILIRUB SERPL-MCNC: 0.4 MG/DL (ref 0.2–1.2)
BUN SERPL-MCNC: 17 MG/DL (ref 7–25)
CALCIUM SERPL-MCNC: 9.9 MG/DL (ref 8.6–10.3)
CHLORIDE SERPL-SCNC: 102 MMOL/L (ref 98–110)
CHOLEST SERPL-MCNC: 150 MG/DL
CHOLEST/HDLC SERPL: 4.2 (CALC)
CO2 SERPL-SCNC: 28 MMOL/L (ref 20–32)
CREAT SERPL-MCNC: 1.58 MG/DL (ref 0.7–1.35)
CREAT UR-MCNC: 284 MG/DL (ref 20–320)
EGFRCR SERPLBLD CKD-EPI 2021: 47 ML/MIN/1.73M2
EOSINOPHIL # BLD AUTO: 270 CELLS/UL (ref 15–500)
EOSINOPHIL NFR BLD AUTO: 3 %
ERYTHROCYTE [DISTWIDTH] IN BLOOD BY AUTOMATED COUNT: 15.4 % (ref 11–15)
EST. AVERAGE GLUCOSE BLD GHB EST-MCNC: 134 MG/DL
EST. AVERAGE GLUCOSE BLD GHB EST-SCNC: 7.4 MMOL/L
GLUCOSE SERPL-MCNC: 150 MG/DL (ref 65–99)
HBA1C MFR BLD: 6.3 %
HCT VFR BLD AUTO: 44.9 % (ref 38.5–50)
HDLC SERPL-MCNC: 36 MG/DL
HGB BLD-MCNC: 13.5 G/DL (ref 13.2–17.1)
LDLC SERPL CALC-MCNC: 89 MG/DL (CALC)
LYMPHOCYTES # BLD AUTO: 1305 CELLS/UL (ref 850–3900)
LYMPHOCYTES NFR BLD AUTO: 14.5 %
MCH RBC QN AUTO: 23.4 PG (ref 27–33)
MCHC RBC AUTO-ENTMCNC: 30.1 G/DL (ref 32–36)
MCV RBC AUTO: 78 FL (ref 80–100)
MICROALBUMIN UR-MCNC: 5.8 MG/DL
MONOCYTES # BLD AUTO: 846 CELLS/UL (ref 200–950)
MONOCYTES NFR BLD AUTO: 9.4 %
NEUTROPHILS # BLD AUTO: 6525 CELLS/UL (ref 1500–7800)
NEUTROPHILS NFR BLD AUTO: 72.5 %
NONHDLC SERPL-MCNC: 114 MG/DL (CALC)
PLATELET # BLD AUTO: 241 THOUSAND/UL (ref 140–400)
PMV BLD REES-ECKER: 9.4 FL (ref 7.5–12.5)
POTASSIUM SERPL-SCNC: 4.7 MMOL/L (ref 3.5–5.3)
PROT SERPL-MCNC: 7 G/DL (ref 6.1–8.1)
RBC # BLD AUTO: 5.76 MILLION/UL (ref 4.2–5.8)
SODIUM SERPL-SCNC: 141 MMOL/L (ref 135–146)
TRIGL SERPL-MCNC: 157 MG/DL
TSH SERPL-ACNC: 2.22 MIU/L (ref 0.4–4.5)
WBC # BLD AUTO: 9 THOUSAND/UL (ref 3.8–10.8)

## 2025-10-02 ENCOUNTER — APPOINTMENT (OUTPATIENT)
Dept: CARDIOLOGY | Facility: CLINIC | Age: 70
End: 2025-10-02
Payer: COMMERCIAL

## 2026-07-29 ENCOUNTER — APPOINTMENT (OUTPATIENT)
Age: 71
End: 2026-07-29
Payer: MEDICARE